# Patient Record
Sex: FEMALE | Race: WHITE | NOT HISPANIC OR LATINO | Employment: UNEMPLOYED | URBAN - METROPOLITAN AREA
[De-identification: names, ages, dates, MRNs, and addresses within clinical notes are randomized per-mention and may not be internally consistent; named-entity substitution may affect disease eponyms.]

---

## 2022-01-11 LAB — NONINV COLON CA DNA+OCC BLD SCRN STL QL: NEGATIVE

## 2022-02-03 ENCOUNTER — HISTORICAL (OUTPATIENT)
Dept: INTERNAL MEDICINE | Facility: CLINIC | Age: 52
End: 2022-02-03

## 2022-02-03 LAB
ABS NEUT (OLG): 6.24 (ref 2.1–9.2)
ALBUMIN SERPL-MCNC: 4 G/DL (ref 3.5–5)
ALBUMIN/GLOB SERPL: 1 {RATIO} (ref 1.1–2)
ALP SERPL-CCNC: 57 U/L (ref 40–150)
ALT SERPL-CCNC: 14 U/L (ref 0–55)
APPEARANCE, UA: CLEAR
AST SERPL-CCNC: 18 U/L (ref 5–34)
BACTERIA SPEC CULT: NORMAL
BASOPHILS # BLD AUTO: 0.1 10*3/UL (ref 0–0.2)
BASOPHILS NFR BLD AUTO: 1 %
BILIRUB SERPL-MCNC: 0.3 MG/DL
BILIRUB UR QL STRIP: NEGATIVE
BILIRUBIN DIRECT+TOT PNL SERPL-MCNC: 0.1 (ref 0–0.5)
BILIRUBIN DIRECT+TOT PNL SERPL-MCNC: 0.2 (ref 0–0.8)
BUN SERPL-MCNC: 10.1 MG/DL (ref 9.8–20.1)
CALCIUM SERPL-MCNC: 10.1 MG/DL (ref 8.7–10.5)
CHLORIDE SERPL-SCNC: 104 MMOL/L (ref 98–107)
CHOLEST SERPL-MCNC: 193 MG/DL
CHOLEST/HDLC SERPL: 4 {RATIO} (ref 0–5)
CO2 SERPL-SCNC: 28 MMOL/L (ref 22–29)
COLOR UR: COLORLESS
CREAT SERPL-MCNC: 0.75 MG/DL (ref 0.55–1.02)
DEPRECATED CALCIDIOL+CALCIFEROL SERPL-MC: 20.8 NG/ML (ref 30–80)
EOSINOPHIL # BLD AUTO: 0.1 10*3/UL (ref 0–0.9)
EOSINOPHIL NFR BLD AUTO: 1 %
ERYTHROCYTE [DISTWIDTH] IN BLOOD BY AUTOMATED COUNT: 18.6 % (ref 11.5–14.5)
EST. AVERAGE GLUCOSE BLD GHB EST-MCNC: 96.8 MG/DL
FERRITIN SERPL-MCNC: 4.27 NG/ML (ref 4.63–204)
FLAG2 (OHS): 50
FLAG3 (OHS): 80
FLAGS (OHS): 80
GLOBULIN SER-MCNC: 3.9 G/DL (ref 2.4–3.5)
GLUCOSE (UA): NORMAL
GLUCOSE SERPL-MCNC: 99 MG/DL (ref 74–100)
HBA1C MFR BLD: 5 %
HCT VFR BLD AUTO: 33.9 % (ref 35–46)
HDLC SERPL-MCNC: 51 MG/DL (ref 35–60)
HEMOLYSIS INTERF INDEX SERPL-ACNC: <0
HGB BLD-MCNC: 10.4 G/DL (ref 12–16)
HGB UR QL STRIP: NEGATIVE
HIV 1+2 AB+HIV1 P24 AG SERPL QL IA: 0.19
HIV 1+2 AB+HIV1 P24 AG SERPL QL IA: NONREACTIVE
HYALINE CASTS #/AREA URNS LPF: NORMAL /[LPF]
ICTERIC INTERF INDEX SERPL-ACNC: 0
IMM GRANULOCYTES # BLD AUTO: 0.03 10*3/UL
IMM GRANULOCYTES NFR BLD AUTO: 0 %
IRON SATN MFR SERPL: 4 % (ref 20–50)
IRON SERPL-MCNC: 18 UG/DL (ref 50–170)
KETONES UR QL STRIP: NEGATIVE
LDLC SERPL CALC-MCNC: 128 MG/DL (ref 50–140)
LEUKOCYTE ESTERASE UR QL STRIP: NEGATIVE
LIPEMIC INTERF INDEX SERPL-ACNC: 2
LOW EVENT # SUSPECT FLAG (OHS): 100
LYMPHOCYTES # BLD AUTO: 2.8 10*3/UL (ref 0.6–4.6)
LYMPHOCYTES NFR BLD AUTO: 29 %
MANUAL DIFF? (OHS): NO
MCH RBC QN AUTO: 22.8 PG (ref 26–34)
MCHC RBC AUTO-ENTMCNC: 30.7 G/DL (ref 31–37)
MCV RBC AUTO: 74.3 FL (ref 80–100)
MO BLASTS SUSPECT FLAG (OHS): 40
MONOCYTES # BLD AUTO: 0.5 10*3/UL (ref 0.1–1.3)
MONOCYTES NFR BLD AUTO: 5 %
NEUTROPHILS # BLD AUTO: 6.24 10*3/UL (ref 2.1–9.2)
NEUTROPHILS NFR BLD AUTO: 64 %
NITRITE UR QL STRIP: NEGATIVE
NRBC BLD AUTO-RTO: 0 % (ref 0–0.2)
PH UR STRIP: 7.5 [PH] (ref 4.5–8)
PLATELET # BLD AUTO: 395 10*3/UL (ref 130–400)
PLATELET CLUMPS SUSPECT FLAG (OHS): 20
PMV BLD AUTO: 9.3 FL (ref 7.4–10.4)
POTASSIUM SERPL-SCNC: 4.5 MMOL/L (ref 3.5–5.1)
PROT SERPL-MCNC: 7.9 G/DL (ref 6.4–8.3)
PROT UR QL STRIP: NEGATIVE
RBC # BLD AUTO: 4.56 10*6/UL (ref 4–5.2)
RBC #/AREA URNS HPF: NORMAL /[HPF] (ref 0–5)
SODIUM SERPL-SCNC: 137 MMOL/L (ref 136–145)
SP GR UR STRIP: 1.01 (ref 1–1.03)
SQUAMOUS EPITHELIAL, UA: NORMAL
TIBC SERPL-MCNC: 382 UG/DL (ref 70–310)
TIBC SERPL-MCNC: 400 UG/DL (ref 250–450)
TRANSFERRIN SERPL-MCNC: 382 MG/DL (ref 180–382)
TRIGL SERPL-MCNC: 68 MG/DL (ref 37–140)
TSH SERPL-ACNC: 1.94 M[IU]/L (ref 0.35–4.94)
URATE SERPL-MCNC: 3.5 MG/DL (ref 2.6–6)
UROBILINOGEN UR STRIP-ACNC: NORMAL
VLDLC SERPL CALC-MCNC: 14 MG/DL
WBC # SPEC AUTO: 9.7 10*3/UL (ref 4.5–11)
WBC #/AREA URNS HPF: NORMAL /[HPF] (ref 0–5)
ZZGIANT PLATELETS (OHS): 20

## 2022-06-22 ENCOUNTER — TELEPHONE (OUTPATIENT)
Dept: INTERNAL MEDICINE | Facility: CLINIC | Age: 52
End: 2022-06-22
Payer: MEDICAID

## 2022-07-08 ENCOUNTER — TELEPHONE (OUTPATIENT)
Dept: INTERNAL MEDICINE | Facility: CLINIC | Age: 52
End: 2022-07-08
Payer: MEDICAID

## 2022-07-08 NOTE — TELEPHONE ENCOUNTER
Please reschedule appt. Due to patient rescheduling her own appt. In patient portal. Thanks in advance

## 2022-07-19 ENCOUNTER — OFFICE VISIT (OUTPATIENT)
Dept: INTERNAL MEDICINE | Facility: CLINIC | Age: 52
End: 2022-07-19
Payer: MEDICAID

## 2022-07-19 VITALS
HEIGHT: 63 IN | BODY MASS INDEX: 28.95 KG/M2 | WEIGHT: 163.38 LBS | DIASTOLIC BLOOD PRESSURE: 88 MMHG | TEMPERATURE: 98 F | SYSTOLIC BLOOD PRESSURE: 131 MMHG | HEART RATE: 70 BPM | RESPIRATION RATE: 18 BRPM

## 2022-07-19 DIAGNOSIS — M17.0 OSTEOARTHRITIS OF BOTH KNEES, UNSPECIFIED OSTEOARTHRITIS TYPE: ICD-10-CM

## 2022-07-19 DIAGNOSIS — Z12.39 ENCOUNTER FOR SCREENING FOR MALIGNANT NEOPLASM OF BREAST, UNSPECIFIED SCREENING MODALITY: ICD-10-CM

## 2022-07-19 DIAGNOSIS — J30.2 SEASONAL ALLERGIES: Primary | ICD-10-CM

## 2022-07-19 DIAGNOSIS — Z12.4 PAP SMEAR FOR CERVICAL CANCER SCREENING: ICD-10-CM

## 2022-07-19 PROCEDURE — 99214 OFFICE O/P EST MOD 30 MIN: CPT | Mod: PBBFAC

## 2022-07-19 RX ORDER — LEVOCETIRIZINE DIHYDROCHLORIDE 5 MG/1
1 TABLET, FILM COATED ORAL NIGHTLY
COMMUNITY
Start: 2022-03-24 | End: 2022-07-19 | Stop reason: SDUPTHER

## 2022-07-19 RX ORDER — DICLOFENAC SODIUM 10 MG/G
2 GEL TOPICAL 4 TIMES DAILY PRN
Qty: 2 G | Refills: 2 | Status: SHIPPED | OUTPATIENT
Start: 2022-07-19 | End: 2023-08-15 | Stop reason: SDUPTHER

## 2022-07-19 RX ORDER — FLUTICASONE PROPIONATE 50 MCG
2 SPRAY, SUSPENSION (ML) NASAL DAILY
COMMUNITY
Start: 2022-06-24 | End: 2022-07-19 | Stop reason: SDUPTHER

## 2022-07-19 RX ORDER — FERROUS SULFATE 325(65) MG
1 TABLET, DELAYED RELEASE (ENTERIC COATED) ORAL EVERY OTHER DAY
COMMUNITY
Start: 2022-06-23 | End: 2022-07-19 | Stop reason: SDUPTHER

## 2022-07-19 RX ORDER — DICLOFENAC SODIUM 10 MG/G
2 GEL TOPICAL 4 TIMES DAILY PRN
COMMUNITY
Start: 2022-04-15 | End: 2022-07-19 | Stop reason: SDUPTHER

## 2022-07-19 RX ORDER — ERGOCALCIFEROL 1.25 MG/1
50000 CAPSULE ORAL
COMMUNITY
Start: 2022-02-05 | End: 2023-04-24 | Stop reason: ALTCHOICE

## 2022-07-19 RX ORDER — FERROUS SULFATE 325(65) MG
325 TABLET, DELAYED RELEASE (ENTERIC COATED) ORAL EVERY OTHER DAY
Qty: 90 TABLET | Refills: 3 | Status: SHIPPED | OUTPATIENT
Start: 2022-07-19 | End: 2023-04-24 | Stop reason: SDUPTHER

## 2022-07-19 RX ORDER — FLUTICASONE PROPIONATE 50 MCG
2 SPRAY, SUSPENSION (ML) NASAL DAILY
Qty: 11.1 ML | Refills: 2 | Status: SHIPPED | OUTPATIENT
Start: 2022-07-19 | End: 2023-04-24 | Stop reason: SDUPTHER

## 2022-07-19 RX ORDER — LEVOCETIRIZINE DIHYDROCHLORIDE 5 MG/1
5 TABLET, FILM COATED ORAL NIGHTLY
Qty: 90 TABLET | Refills: 3 | Status: SHIPPED | OUTPATIENT
Start: 2022-07-19 | End: 2023-04-24 | Stop reason: SDUPTHER

## 2022-07-19 RX ORDER — CHOLECALCIFEROL (VITAMIN D3) 25 MCG
1000 TABLET ORAL DAILY
COMMUNITY
Start: 2022-06-23

## 2022-07-19 RX ORDER — DICLOFENAC SODIUM 75 MG/1
75 TABLET, DELAYED RELEASE ORAL 2 TIMES DAILY
COMMUNITY
Start: 2022-04-20

## 2022-07-19 NOTE — PROGRESS NOTES
[facility]    Subjective:        This is a 51-year-old female with PMH allergies, tobacco use, and bilateral knee osteoarthritis who presents today for 6 month f/u. At last visit, she was referred to Ortho clinic here at [facility] and prescribed offloading brace and oral diclofenac with temporary relief and the brace has caused her to develop blisters. She has tried voltaren gel and NSAIDs, which provides minimal relief. She was referred to Orthopedic Surgery in [place], but states she does not have the moneyto travel that far. She is hesitant about surgery due to concerns for decreased mobility and the inability to complete her ADLs. She works at  store.     Today she states her knees are getting worse, left > right. Describes pain as shooting pain in knee that radiates down to LE. She also reports occasional locking of the knee. This pain has limited her activities.She has tried steroid injections, which only helped for a week.    Allergy symptoms have improved with flonase and xyzal. She does smoke 1 PPD and started smoking around age 16 then quit at age 26 then started again around age 42. She is not interested in quitting. Patient denies chest pain, palpitations, shortness of breath, abdominal pain, melena, hematochezia, dysuria, hematuria, nausea, vomiting, fever/chills, dizziness, lightheadedness.    Xray knee 3/2022  FINDINGS:     AP and lateral views of both knees were obtained. PA standing flexion  and sunrise views of both knees were obtained.     Left knee:     Tricompartmental arthritic changes affecting the left knee are seen  including moderate to severe medial compartment joint space narrowing,  medial compartment predominant subchondral sclerosis, and  tricompartmental marginal osteophytosis. No radiopaque foreign bodies  are visualized. No evidence of acute displaced fracture or active  dislocation is visualized. Small suprapatellar recess joint effusion  is seen. There  appears to be evidence of calcification along the  expected location of the superior MCL bilaterally, possibly related to  remote ligamentous injury.     Right knee:     Tricompartment arthritic changes affecting the right knee are seen  including severe medial compartment joint space narrowing, medial  compartment predominant subchondral sclerosis, and tricompartmental  marginal osteophytosis. Mild medial patellofemoral compartment joint  space narrowing is suspected. Multiple calcifications along the  posterior aspect of the knee are noted, similar to the previous study.  The possibility of joint bodies cannot be entirely excluded. There  appears to be evidence of calcification along the expected location of  the superior MCL bilaterally, possibly related to remote ligamentous  injury.     IMPRESSION:  1. No definite evidence of acute displaced fracture or active  dislocation is visualized. Medial compartment prominent arthritic  changes are seen. Additional findings and details as above.    Review of Systems:  10 point ROS negative except for HPI    Objective:   Vital Signs:  Vitals:    07/19/22 1247   BP: 131/88   Pulse: 70   Resp: 18   Temp: 97.9 °F (36.6 °C)        Body mass index is 28.95 kg/m².     General:  Well developed, well nourished, no acute respiratory distress  Head: Normocephalic, atraumatic  Eyes: PERRL, EOMI, anicteric sclera  Throat: No posterior pharyngeal erythema or exudate, no tonsillar exudate  Neck: supple, normal ROM, no JVD  CVS:  RRR, S1 and S2 normal, no murmurs, no added heart sounds, rubs, gallops, regular peripheral pulses, and no peripheral edema  Resp:  Lungs clear to auscultation bilaterally, no wheezes, rales, or rhonci  GI:  Abdomen soft, non-tender, non-distended, normoactive bowel sounds  MSK:  Full range of motion, swelling bilateral knees. L knee in brace.   Skin:  No rashes, ulcers, erythema. Blisters posterior L knee   Neuro:  Alert and oriented x3, No focal neuro  deficits, CNII-XII grossly intact  Psych:  Appropriate mood and affect         Laboratory:  Lab Results   Component Value Date    WBC 9.7 02/03/2022    HGB 10.4 02/03/2022    HCT 33.9 02/03/2022     02/03/2022    MCV 74.3 02/03/2022    RDW 18.6 02/03/2022     Lab Results   Component Value Date    HGBA1C 5.0 02/03/2022    EAG 96.8 02/03/2022    CREATININE 0.75 02/03/2022    Lab Results   Component Value Date     02/03/2022    K 4.5 02/03/2022    CO2 28 02/03/2022    BUN 10.1 02/03/2022    CREATININE 0.75 02/03/2022    CALCIUM 10.1 02/03/2022     Lab Results   Component Value Date    TSH 1.9372 02/03/2022          Anemia:   Lab Results   Component Value Date    IRON 18 02/03/2022    TIBC 400 02/03/2022    FERRITIN 4.27 02/03/2022       Current Medications:  Current Outpatient Medications   Medication Instructions    diclofenac (VOLTAREN) 75 mg, Oral, 2 times daily    diclofenac sodium (VOLTAREN) 2 g, Topical (Top), 4 times daily PRN    ergocalciferol (ERGOCALCIFEROL) 50,000 Units, Oral, Every 7 days    ferrous sulfate 325 mg, Oral, Every other day    fluticasone propionate (FLONASE) 100 mcg, Each Nostril, Daily    levocetirizine (XYZAL) 5 mg, Oral, Nightly    vitamin D (VITAMIN D3) 1,000 Units, Oral, Daily        Assessment and Plan:        Health Maintenance   Topic Date Due    Hepatitis C Screening  Never done    TETANUS VACCINE  Never done    Mammogram  Never done    Lipid Panel  02/03/2027        Seasonal Allergies  - continue levo cetirizine and flonase   - has improved symptoms     Bilateral knee osteoarthritis  -Bilateral xray 3/2022 showed medial compartment prominent arthritic  changes  -Sports medicine clinic prescribed diclofenac po and offloading brace   - minimal relief with voltaren gel   - recommend appt with Orthopedic surgery to discuss imaging and treatment options   - recommend continued physical therapy and continuihng diclofenac PO and voltaren gel for now     Tobacco  use  -Counseled patient on benefits of smoking cessation. Patient verbalized understanding. She states she is not ready to quit at this time    Health Maintenance/ Wellness  Pneumococcal vaccine: n/a  TDAP: UTD  Influenza vaccine: refused  Shingrix vaccine: Refused  AAA U/S screening:n/a  Bone Density Screening: n/a  Mammogram: referral sent today   Pap smear: Referral to gyn placed (preferably female)   Screening colonoscopy: Cologuard negative 1/2022. Repeat 3 years   Lung Cancer Screening: We will need a low-dose CT next year  Hepatitis Panel: Ordered  COVID-19 Vaccine: Refused    Counseling:  - Patient instructed to limit alcohol use  - Patient counselled on smoking cessation  - Educated on diet (portion control) and exercise (at least 30 minutes per day)  - Relevant educational materials provided      Medications: reconciled, discussed and refills given.  RTC in 6 months              Hilda Masters MD

## 2022-07-19 NOTE — PROGRESS NOTES
[facility] INTERNAL MEDICINE  OUTPATIENT OFFICE VISIT NOTE    SUBJECTIVE:      Chief Complaint: Establish Care (Establish care with new PCP. Knee brace concerns.9/10 pain when brace is removed. )       HPI:       Past Medical History:   has no past medical history on file.     Past Surgical History:   has a past surgical history that includes Knee surgery (Left).     Family History:  family history includes Arthritis in her maternal grandfather, maternal grandmother, and mother; COPD in her mother; Cancer in her maternal grandfather; Diabetes in her maternal grandfather; Heart disease in her maternal grandfather and mother; Hypertension in her maternal grandfather.     Social History:   reports that she has been smoking cigarettes. She has been smoking about 1.00 pack per day. She has never used smokeless tobacco. She reports previous alcohol use. She reports current drug use. Frequency: 4.00 times per week. Drug: Marijuana.     Allergies:  is allergic to hydrocodone-acetaminophen.     Home Medications:  Prior to Admission medications    Medication Sig Start Date End Date Taking? Authorizing Provider   diclofenac (VOLTAREN) 75 MG EC tablet Take 75 mg by mouth 2 (two) times daily. 4/20/22  Yes Historical Provider   diclofenac sodium (VOLTAREN) 1 % Gel Apply 2 g topically 4 (four) times daily as needed for Pain. 4/15/22  Yes Historical Provider   ferrous sulfate 325 (65 FE) MG EC tablet Take 1 tablet by mouth every other day. 6/23/22  Yes Historical Provider   fluticasone propionate (FLONASE) 50 mcg/actuation nasal spray 2 sprays by Each Nostril route once daily. 6/24/22  Yes Historical Provider   levocetirizine (XYZAL) 5 MG tablet Take 1 tablet by mouth every evening. 3/24/22  Yes Historical Provider   vitamin D (VITAMIN D3) 1000 units Tab Take 1,000 Units by mouth once daily. 6/23/22  Yes Historical Provider   ergocalciferol (ERGOCALCIFEROL) 50,000 unit Cap Take 50,000 Units by mouth every 7 days. 2/5/22   Historical  "Provider   albuterol 90 mcg/actuation inhaler Inhale 1-2 puffs into the lungs every 6 (six) hours as needed for Wheezing. Rescue 2/11/17 7/19/22  Gladys Goetz MD       ROS:  [unfilled]       OBJECTIVE:     Vital signs:   /88 (BP Location: Left arm, Patient Position: Sitting, BP Method: Medium (Automatic))   Pulse 70   Temp 97.9 °F (36.6 °C) (Oral)   Resp 18   Ht 5' 3" (1.6 m)   Wt 74.1 kg (163 lb 6.4 oz)   LMP 07/14/2022 (Exact Date)   BMI 28.95 kg/m²      Physical Examination:  {Physical Exam:77777}    Labs:  {LABS:76805}      ASSESSMENT & PLAN:     No problem-specific Assessment & Plan notes found for this encounter.         ***  -    ***  -    ***  -    ***  -    ***  -      ***  -      @[facility]@      Return to clinic {Return to clinic:28472}    Hilda Masters MD  \[facility] [place] [facility]\"" Internal Medicine, HO-1        "

## 2022-07-19 NOTE — PROGRESS NOTES
Attending Physician Addendum  Pt seen and discussed with medicine resident in clinic  Care provided is appropriate  Pt encouraged to consider an orthopedic surgery referral to discuss her options  Agree with above ASSESSMENT AND PLAN

## 2023-04-24 ENCOUNTER — OFFICE VISIT (OUTPATIENT)
Dept: INTERNAL MEDICINE | Facility: CLINIC | Age: 53
End: 2023-04-24
Payer: MEDICAID

## 2023-04-24 VITALS
SYSTOLIC BLOOD PRESSURE: 132 MMHG | HEART RATE: 77 BPM | TEMPERATURE: 98 F | OXYGEN SATURATION: 95 % | DIASTOLIC BLOOD PRESSURE: 78 MMHG | RESPIRATION RATE: 19 BRPM | WEIGHT: 168 LBS | HEIGHT: 63 IN | BODY MASS INDEX: 29.77 KG/M2

## 2023-04-24 DIAGNOSIS — M17.0 OSTEOARTHRITIS OF BOTH KNEES, UNSPECIFIED OSTEOARTHRITIS TYPE: ICD-10-CM

## 2023-04-24 DIAGNOSIS — Z12.39 ENCOUNTER FOR SCREENING FOR MALIGNANT NEOPLASM OF BREAST, UNSPECIFIED SCREENING MODALITY: ICD-10-CM

## 2023-04-24 DIAGNOSIS — Z12.2 SCREENING FOR LUNG CANCER: ICD-10-CM

## 2023-04-24 DIAGNOSIS — J30.2 SEASONAL ALLERGIES: ICD-10-CM

## 2023-04-24 DIAGNOSIS — G47.00 INSOMNIA, UNSPECIFIED TYPE: ICD-10-CM

## 2023-04-24 DIAGNOSIS — D50.9 IRON DEFICIENCY ANEMIA, UNSPECIFIED IRON DEFICIENCY ANEMIA TYPE: Primary | ICD-10-CM

## 2023-04-24 DIAGNOSIS — Z00.00 HEALTHCARE MAINTENANCE: ICD-10-CM

## 2023-04-24 DIAGNOSIS — Z12.4 CERVICAL CANCER SCREENING: ICD-10-CM

## 2023-04-24 PROCEDURE — 99215 OFFICE O/P EST HI 40 MIN: CPT | Mod: PBBFAC

## 2023-04-24 RX ORDER — FLUTICASONE PROPIONATE 50 MCG
2 SPRAY, SUSPENSION (ML) NASAL DAILY
Qty: 11.1 ML | Refills: 2 | Status: SHIPPED | OUTPATIENT
Start: 2023-04-24 | End: 2023-08-15 | Stop reason: SDUPTHER

## 2023-04-24 RX ORDER — LEVOCETIRIZINE DIHYDROCHLORIDE 5 MG/1
5 TABLET, FILM COATED ORAL NIGHTLY
Qty: 90 TABLET | Refills: 3 | Status: SHIPPED | OUTPATIENT
Start: 2023-04-24 | End: 2023-08-15 | Stop reason: SDUPTHER

## 2023-04-24 RX ORDER — DICLOFENAC SODIUM 10 MG/G
2 GEL TOPICAL 4 TIMES DAILY
Qty: 100 G | Refills: 3 | Status: SHIPPED | OUTPATIENT
Start: 2023-04-24 | End: 2023-11-24 | Stop reason: SDUPTHER

## 2023-04-24 RX ORDER — DICLOFENAC SODIUM 75 MG/1
75 TABLET, DELAYED RELEASE ORAL 2 TIMES DAILY
Status: CANCELLED | OUTPATIENT
Start: 2023-04-24

## 2023-04-24 RX ORDER — FERROUS SULFATE 325(65) MG
325 TABLET, DELAYED RELEASE (ENTERIC COATED) ORAL EVERY OTHER DAY
Qty: 90 TABLET | Refills: 3 | Status: SHIPPED | OUTPATIENT
Start: 2023-04-24 | End: 2023-08-15 | Stop reason: SDUPTHER

## 2023-04-24 RX ORDER — TRAZODONE HYDROCHLORIDE 50 MG/1
25 TABLET ORAL NIGHTLY
Qty: 30 TABLET | Refills: 1 | Status: SHIPPED | OUTPATIENT
Start: 2023-04-24 | End: 2023-08-15 | Stop reason: SDUPTHER

## 2023-04-24 NOTE — PROGRESS NOTES
I have reviewed and concur with the resident's history, physical, assessment, and plan.  I have discussed with him all issues related to the diagnosis, workup and treatment plan. Care provided as reasonable and necessary.    Jay Alex MD  [facility]

## 2023-04-24 NOTE — PROGRESS NOTES
[facility] Resident [facility]    Subjective:        This is a 51-year-old female with PMH allergies, tobacco use, and bilateral knee osteoarthritis who presents today for 6 month f/u. Patient continues to complains on bilateral knee pain and left sided sciatica. She has tried voltaren gel and NSAIDs, which provides minimal relief. She will be referred to Ortho for further evaluation and possible intraarticular injections. She is hesitant about surgery due to concerns for decreased mobility and the inability to complete her ADLs. 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 She has trouble going to sleep and staying asleep. Only sleeps for 2 hours. Has tried melatonin in the past w/o relief.     Allergy symptoms have improved with flonase and xyzal. She does smoke 1 PPD and started smoking around age 16 then quit at age 26 then started again around age 42. She is not interested in quitting. Patient denies chest pain, palpitations, shortness of breath, abdominal pain, melena, hematochezia, dysuria, hematuria, nausea, vomiting, fever/chills, dizziness, lightheadedness.        Xray knee 3/2022  FINDINGS:     AP and lateral views of both knees were obtained. PA standing flexion  and sunrise views of both knees were obtained.     Left knee:     Tricompartmental arthritic changes affecting the left knee are seen  including moderate to severe medial compartment joint space narrowing,  medial compartment predominant subchondral sclerosis, and  tricompartmental marginal osteophytosis. No radiopaque foreign bodies  are visualized. No evidence of acute displaced fracture or active  dislocation is visualized. Small suprapatellar recess joint effusion  is seen. There appears to be evidence of calcification along the  expected location of the superior MCL bilaterally, possibly related to  remote ligamentous injury.     Right knee:     Tricompartment arthritic changes affecting the right knee are  seen  including severe medial compartment joint space narrowing, medial  compartment predominant subchondral sclerosis, and tricompartmental  marginal osteophytosis. Mild medial patellofemoral compartment joint  space narrowing is suspected. Multiple calcifications along the  posterior aspect of the knee are noted, similar to the previous study.  The possibility of joint bodies cannot be entirely excluded. There  appears to be evidence of calcification along the expected location of  the superior MCL bilaterally, possibly related to remote ligamentous  injury.     IMPRESSION:  1. No definite evidence of acute displaced fracture or active  dislocation is visualized. Medial compartment prominent arthritic  changes are seen. Additional findings and details as above.    Review of Systems:  10 point ROS negative except for HPI    Objective:   Vital Signs:  Vitals:    04/24/23 0918   BP: 132/78   Pulse:    Resp:    Temp:         Body mass index is 29.76 kg/m².     General:  Well developed, well nourished, no acute respiratory distress  Head: Normocephalic, atraumatic  Eyes: PERRL, EOMI, anicteric sclera  Throat: No posterior pharyngeal erythema or exudate, no tonsillar exudate  Neck: supple, normal ROM, no JVD  CVS:  RRR, S1 and S2 normal, no murmurs, no added heart sounds, rubs, gallops, regular peripheral pulses, and no peripheral edema  Resp:  Lungs clear to auscultation bilaterally, no wheezes, rales, or rhonci  GI:  Abdomen soft, non-tender, non-distended, normoactive bowel sounds  MSK:  Full range of motion, swelling bilateral knees. L knee in brace.   Skin:  No rashes, ulcers, erythema. Blisters posterior L knee   Neuro:  Alert and oriented x3, No focal neuro deficits, CNII-XII grossly intact  Psych:  Appropriate mood and affect         Laboratory:  Lab Results   Component Value Date    WBC 9.7 02/03/2022    HGB 10.4 02/03/2022    HCT 33.9 02/03/2022     02/03/2022    MCV 74.3 02/03/2022    RDW 18.6  02/03/2022     Lab Results   Component Value Date    HGBA1C 5.0 02/03/2022    EAG 96.8 02/03/2022    CREATININE 0.75 02/03/2022    Lab Results   Component Value Date     02/03/2022    K 4.5 02/03/2022    CO2 28 02/03/2022    BUN 10.1 02/03/2022    CREATININE 0.75 02/03/2022    CALCIUM 10.1 02/03/2022     Lab Results   Component Value Date    TSH 1.9372 02/03/2022          Anemia:   Lab Results   Component Value Date    IRON 18 02/03/2022    TIBC 400 02/03/2022    FERRITIN 4.27 02/03/2022       Current Medications:  Current Outpatient Medications   Medication Instructions    diclofenac sodium (VOLTAREN) 2 g, Topical (Top), 4 times daily PRN    ergocalciferol (ERGOCALCIFEROL) 50,000 Units, Oral, Every 7 days    ferrous sulfate 325 mg, Oral, Every other day    fluticasone propionate (FLONASE) 100 mcg, Each Nostril, Daily    levocetirizine (XYZAL) 5 mg, Oral, Nightly    vitamin D (VITAMIN D3) 1,000 Units, Oral, Daily        Assessment and Plan:        Health Maintenance   Topic Date Due    Hepatitis C Screening  Never done    TETANUS VACCINE  Never done    Mammogram  Never done    Lipid Panel  02/03/2027        Seasonal Allergies  - continue levo cetirizine and flonase   - has improved symptoms     Bilateral knee osteoarthritis  -Bilateral xray 3/2022 showed medial compartment prominent arthritic  changes  -Sports medicine clinic prescribed diclofenac po and offloading brace   - minimal relief with voltaren gel   - Referral to Orthopedic to discuss imaging and treatment options. She is interested in injections\.   - recommend continued physical therapy and ibuprofen and voltaren gel for now     Tobacco use  -Counseled patient on benefits of smoking cessation. Patient verbalized understanding. She states she is not ready to quit at this time  --ordering low dose CT for lung ca screening     Insomnia   --difficulties falling asleep and staying asleep, only sleeps about 2 hours.   --Has tried sleep hygiene and  melatonin without relief.   --Will prescribe trazodone 25 mg and monitor for improvement     Health Maintenance/ Wellness  Pneumococcal vaccine: n/a  TDAP: UTD  Influenza vaccine: refused  Shingrix vaccine: Refused  AAA U/S screening:n/a  Bone Density Screening: n/a  Mammogram: referral sent today   Pap smear: Referral to gyn placed (preferably female)   Screening colonoscopy: Cologuard negative 1/2022. Repeat 3 years   Lung Cancer Screening:ordered today   Hepatitis Panel: Ordered  COVID-19 Vaccine: Refused    Counseling:  - Patient instructed to limit alcohol use  - Patient counselled on smoking cessation  - Educated on diet (portion control) and exercise (at least 30 minutes per day)  - Relevant educational materials provided      Medications: reconciled, discussed and refills given.  RTC in 6 months              Hilda Masters MD

## 2023-04-26 ENCOUNTER — DOCUMENTATION ONLY (OUTPATIENT)
Dept: ADMINISTRATIVE | Facility: HOSPITAL | Age: 53
End: 2023-04-26
Payer: MEDICAID

## 2023-05-05 ENCOUNTER — PATIENT MESSAGE (OUTPATIENT)
Dept: INTERNAL MEDICINE | Facility: CLINIC | Age: 53
End: 2023-05-05
Payer: MEDICAID

## 2023-05-05 DIAGNOSIS — T78.2XXA ANAPHYLAXIS, INITIAL ENCOUNTER: Primary | ICD-10-CM

## 2023-05-05 RX ORDER — EPINEPHRINE 0.3 MG/.3ML
2 INJECTION SUBCUTANEOUS ONCE
Qty: 0.6 ML | Refills: 0 | Status: SHIPPED | OUTPATIENT
Start: 2023-05-05 | End: 2024-04-19

## 2023-05-06 ENCOUNTER — PATIENT MESSAGE (OUTPATIENT)
Dept: INTERNAL MEDICINE | Facility: CLINIC | Age: 53
End: 2023-05-06
Payer: MEDICAID

## 2023-05-11 NOTE — TELEPHONE ENCOUNTER
Call placed to Ortho to f/u on pt apt. Ortho stated they attempted to set apt. And a msg was left to have patient RTC to set apt. Contacted pt who stated she can't see them until next month due to her job. In formed pt she needs to set apt ASAP. Informed pt her request will be sent to M.D.

## 2023-06-22 ENCOUNTER — PATIENT MESSAGE (OUTPATIENT)
Dept: INTERNAL MEDICINE | Facility: CLINIC | Age: 53
End: 2023-06-22
Payer: MEDICAID

## 2023-06-22 ENCOUNTER — OFFICE VISIT (OUTPATIENT)
Dept: ORTHOPEDICS | Facility: CLINIC | Age: 53
End: 2023-06-22
Payer: MEDICAID

## 2023-06-22 ENCOUNTER — HOSPITAL ENCOUNTER (OUTPATIENT)
Dept: RADIOLOGY | Facility: HOSPITAL | Age: 53
Discharge: HOME | End: 2023-06-22
Attending: STUDENT IN AN ORGANIZED HEALTH CARE EDUCATION/TRAINING PROGRAM
Payer: MEDICAID

## 2023-06-22 VITALS
DIASTOLIC BLOOD PRESSURE: 80 MMHG | SYSTOLIC BLOOD PRESSURE: 138 MMHG | HEART RATE: 81 BPM | HEIGHT: 64 IN | WEIGHT: 170 LBS | BODY MASS INDEX: 29.02 KG/M2

## 2023-06-22 DIAGNOSIS — M17.0 OSTEOARTHRITIS OF BOTH KNEES, UNSPECIFIED OSTEOARTHRITIS TYPE: ICD-10-CM

## 2023-06-22 DIAGNOSIS — M17.0 OSTEOARTHRITIS OF BOTH KNEES, UNSPECIFIED OSTEOARTHRITIS TYPE: Primary | ICD-10-CM

## 2023-06-22 DIAGNOSIS — Z87.828 HISTORY OF MENISCAL TEAR: ICD-10-CM

## 2023-06-22 PROCEDURE — 99214 OFFICE O/P EST MOD 30 MIN: CPT | Mod: PBBFAC

## 2023-06-22 PROCEDURE — 73564 X-RAY EXAM KNEE 4 OR MORE: CPT | Mod: TC,LT

## 2023-06-22 PROCEDURE — 73564 X-RAY EXAM KNEE 4 OR MORE: CPT | Mod: TC,RT

## 2023-06-22 RX ORDER — MELOXICAM 15 MG/1
15 TABLET ORAL DAILY
Qty: 7 TABLET | Refills: 0 | Status: SHIPPED | OUTPATIENT
Start: 2023-06-22 | End: 2023-06-29

## 2023-06-22 RX ORDER — LEVOCETIRIZINE DIHYDROCHLORIDE 5 MG/1
1 TABLET, FILM COATED ORAL NIGHTLY
COMMUNITY
Start: 2022-07-19

## 2023-06-22 RX ORDER — FLUTICASONE PROPIONATE 50 MCG
100 SPRAY, SUSPENSION (ML) NASAL
COMMUNITY
Start: 2022-07-19 | End: 2023-12-29 | Stop reason: SDUPTHER

## 2023-06-22 RX ORDER — CHOLECALCIFEROL (VITAMIN D3) 25 MCG
1 TABLET ORAL DAILY
COMMUNITY
Start: 2022-12-05
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
## 2023-06-22 NOTE — LETTER
June 22, 2023      [facility]  [address] [place] [id number]  Phone: [phone]       Patient: Beverly Nixon   YOB: 1970  Date of Visit: 06/22/2023    To Whom It May Concern:    Josias Nixon  was at [facility] on 06/22/2023. The patient may return to work/school on 06/22/2023 with no restrictions. If you have any questions or concerns, or if I can be of further assistance, please do not hesitate to contact me.    Sincerely,    MD Emelia Hall MA

## 2023-06-23 ENCOUNTER — TELEPHONE (OUTPATIENT)
Dept: INTERNAL MEDICINE | Facility: CLINIC | Age: 53
End: 2023-06-23
Payer: MEDICAID

## 2023-06-23 DIAGNOSIS — M19.90 ARTHRITIS: Primary | ICD-10-CM

## 2023-06-23 NOTE — PROGRESS NOTES
Faculty Attestation: Beverly Nixon  was seen in [facility]. Discussed with resident at the time of the visit. History of Present Illness, Physical Exam, and Assessment and Plan reviewed. Treatment plan is reasonable and appropriate. Compliance with treatment recommendations is important.  Radiology images independently reviewed and agree with resident interpretation.  No procedure was performed.     Bunny Arreguin MD

## 2023-06-23 NOTE — TELEPHONE ENCOUNTER
I called [facility]'s pharmacy this morning and provided them with the correct diagnosis code. They were able to process the prescription and it is ready for pickup.

## 2023-06-23 NOTE — TELEPHONE ENCOUNTER
Called pt regarding request for labs. Unable to contact her.Left message to have her RTC to clinic @ [phone].

## 2023-06-23 NOTE — TELEPHONE ENCOUNTER
Good Morning , Pt request labs to be done . Pt seen in Ortho, Refer to Visit.They have suggested that she have a work up for possible Arthritis/ RA. Please address. Thank you

## 2023-06-23 NOTE — TELEPHONE ENCOUNTER
Patient stated a diagnosis code is needed to be attached to Meloxicam rx that was prescribed on yesterday while in [facility]. Please review and advise

## 2023-06-23 NOTE — TELEPHONE ENCOUNTER
----- Message from Grisel Prescott MD sent at 6/23/2023  7:52 AM CDT -----  I associated proper code with meloxicam prescription. Or do you need me to send the prescription again? Let me know, thanks.     Dr. Mitchell

## 2023-06-26 ENCOUNTER — TELEPHONE (OUTPATIENT)
Dept: INTERNAL MEDICINE | Facility: CLINIC | Age: 53
End: 2023-06-26
Payer: MEDICAID

## 2023-06-26 NOTE — TELEPHONE ENCOUNTER
Called pt to inform her that Auto [facility] labs have been ordered. Pt verbalize understanding of information giving. States she will have labs done.

## 2023-07-10 ENCOUNTER — PATIENT MESSAGE (OUTPATIENT)
Dept: ADMINISTRATIVE | Facility: HOSPITAL | Age: 53
End: 2023-07-10
Payer: MEDICAID

## 2023-07-24 ENCOUNTER — PATIENT MESSAGE (OUTPATIENT)
Dept: INTERNAL MEDICINE | Facility: CLINIC | Age: 53
End: 2023-07-24
Payer: MEDICAID

## 2023-07-24 DIAGNOSIS — J30.9 ALLERGIC RHINITIS, UNSPECIFIED SEASONALITY, UNSPECIFIED TRIGGER: Primary | ICD-10-CM

## 2023-07-31 ENCOUNTER — PATIENT MESSAGE (OUTPATIENT)
Dept: INTERNAL MEDICINE | Facility: CLINIC | Age: 53
End: 2023-07-31
Payer: MEDICAID

## 2023-08-01 ENCOUNTER — PATIENT MESSAGE (OUTPATIENT)
Dept: INTERNAL MEDICINE | Facility: CLINIC | Age: 53
End: 2023-08-01
Payer: MEDICAID

## 2023-08-01 NOTE — TELEPHONE ENCOUNTER
Yes Rasta  [address] in between internal medicine and radiology . Thank you for your message!

## 2023-08-04 ENCOUNTER — HOSPITAL ENCOUNTER (OUTPATIENT)
Dept: RADIOLOGY | Facility: HOSPITAL | Age: 53
Discharge: HOME | End: 2023-08-04
Payer: MEDICAID

## 2023-08-04 DIAGNOSIS — Z12.39 ENCOUNTER FOR SCREENING FOR MALIGNANT NEOPLASM OF BREAST, UNSPECIFIED SCREENING MODALITY: ICD-10-CM

## 2023-08-04 PROCEDURE — 77067 SCR MAMMO BI INCL CAD: CPT | Mod: TC

## 2023-08-04 PROCEDURE — 77063 BREAST TOMOSYNTHESIS BI: CPT | Mod: 26,,, | Performed by: RADIOLOGY

## 2023-08-04 PROCEDURE — 77067 MAMMO DIGITAL SCREENING BILAT WITH TOMO: ICD-10-PCS | Mod: 26,,, | Performed by: RADIOLOGY

## 2023-08-04 PROCEDURE — 77063 MAMMO DIGITAL SCREENING BILAT WITH TOMO: ICD-10-PCS | Mod: 26,,, | Performed by: RADIOLOGY

## 2023-08-04 PROCEDURE — 77067 SCR MAMMO BI INCL CAD: CPT | Mod: 26,,, | Performed by: RADIOLOGY

## 2023-08-15 DIAGNOSIS — G47.00 INSOMNIA, UNSPECIFIED TYPE: ICD-10-CM

## 2023-08-15 DIAGNOSIS — M17.0 OSTEOARTHRITIS OF BOTH KNEES, UNSPECIFIED OSTEOARTHRITIS TYPE: ICD-10-CM

## 2023-08-15 DIAGNOSIS — J30.2 SEASONAL ALLERGIES: ICD-10-CM

## 2023-08-16 ENCOUNTER — PATIENT MESSAGE (OUTPATIENT)
Dept: INTERNAL MEDICINE | Facility: CLINIC | Age: 53
End: 2023-08-16
Payer: MEDICAID

## 2023-08-16 RX ORDER — FLUTICASONE PROPIONATE 50 MCG
2 SPRAY, SUSPENSION (ML) NASAL DAILY
Qty: 11.1 ML | Refills: 2 | Status: SHIPPED | OUTPATIENT
Start: 2023-08-16 | End: 2023-11-24 | Stop reason: SDUPTHER

## 2023-08-16 RX ORDER — LEVOCETIRIZINE DIHYDROCHLORIDE 5 MG/1
5 TABLET, FILM COATED ORAL NIGHTLY
Qty: 90 TABLET | Refills: 3 | Status: SHIPPED | OUTPATIENT
Start: 2023-08-16 | End: 2023-11-24 | Stop reason: SDUPTHER

## 2023-08-16 RX ORDER — TRAZODONE HYDROCHLORIDE 50 MG/1
25 TABLET ORAL NIGHTLY
Qty: 30 TABLET | Refills: 1 | Status: SHIPPED | OUTPATIENT
Start: 2023-08-16 | End: 2023-11-24 | Stop reason: SDUPTHER

## 2023-08-16 RX ORDER — DICLOFENAC SODIUM 10 MG/G
2 GEL TOPICAL 4 TIMES DAILY PRN
Qty: 2 G | Refills: 2 | Status: SHIPPED | OUTPATIENT
Start: 2023-08-16

## 2023-08-16 RX ORDER — FERROUS SULFATE 325(65) MG
325 TABLET, DELAYED RELEASE (ENTERIC COATED) ORAL EVERY OTHER DAY
Qty: 90 TABLET | Refills: 3 | Status: SHIPPED | OUTPATIENT
Start: 2023-08-16 | End: 2023-11-24 | Stop reason: SDUPTHER

## 2023-08-30 ENCOUNTER — PATIENT OUTREACH (OUTPATIENT)
Dept: ADMINISTRATIVE | Facility: HOSPITAL | Age: 53
End: 2023-08-30
Payer: MEDICAID

## 2023-08-30 NOTE — PROGRESS NOTES
Message sent to patient via [facility] patient portal as part of Population Health Bulk Outreach for cervical cancer screening. Chart reviewed. Pt has an appt scheduled in [facility] clinic on 3/8/2024. I encouraged pt to keep this very important appointment. If she has any urgent needs that may warrant a sooner appointment, I provided the phone number to contact the gyn clinic.    Population Health Chart Review & Patient Outreach Details:     Reason for Outreach Encounter:     []  Non-Compliant Report   []  Payor Report ([employer], PHN, BCBS, MSSP, MCIP, [facility], etc.)   []  Pre-Visit Chart Review     Updates Requested / Reviewed:     [x]  Care Everywhere    [x]     []  External Sources ([facility], [employer], DIS, etc.)   [x]  Care Team Updated    Patient Outreach Method:    []  Telephone Outreach Completed   [] Successful   [] Left Voicemail   [] Unable to Contact (wrong number, no voicemail)  [x]  [facility] Outreach Sent  []  Letter Outreach Mailed  []  Fax Sent for External Records  []  External Records Upload    Health Maintenance Topics Addressed and Outreach Outcomes / Actions Taken:        []      Breast Cancer Screening []  Mammo Scheduled      []  External Records Requested     []  Added Reminder to Complete to Upcoming Primary Care Appt Notes     []  Patient Declined     []  Patient Will Call Back to Schedule     []  Patient Will Schedule with External Provider / Order Routed if Applicable             [x]       Cervical Cancer Screening [x]  Pap Scheduled - appt 3/8/2024 [facility] clinic     []  External Records Requested     []  Added Reminder to Complete to Upcoming Primary Care Appt Notes     []  Patient Declined     []  Patient Will Call Back to Schedule     []  Patient Will Schedule with External Provider               []          Colorectal Cancer Screening []  Colonoscopy Case Request or Referral Placed     []  External Records Requested     []  Added Reminder to Complete to Upcoming Primary Care  Appt Notes     []  Patient Declined     []  Patient Will Call Back to Schedule     []  Patient Will Schedule with External Provider     []  Fit Kit Mailed (add the SmartPhrase under additional notes)     []  Reminded Patient to Complete Home Test             []      Diabetic Eye Exam []  Eye Camera Scheduled or Optometry Referral Placed     []  External Records Requested     []  Added Reminder to Complete to Upcoming Primary Care Appt Notes     []  Patient Declined     []  Patient Will Call Back to Schedule     []  Patient Will Schedule with External Provider             []      Blood Pressure Control []  Primary Care Follow Up Visit Scheduled     []  Remote Blood Pressure Reading Captured     []  Added Reminder to Complete to Upcoming Primary Care Appt Notes     []  Patient Declined     []  Patient Will Call Back / Patient Will Send Portal Message with Reading     []  Patient Will Call Back to Schedule Provider Visit             []       HbA1c & Other Labs []  Lab Appt Scheduled for Due Labs     []  Primary Care Follow Up Visit Scheduled      []  Reminded Patient to Complete Home Test     []  Added Reminder to Complete to Upcoming Primary Care Appt Notes     []  Patient Declined     []  Patient Will Call Back to Schedule     []  Patient Will Schedule with External Provider / Order Routed if Applicable           []    Schedule Primary Care Appt []  Primary Care Appt Scheduled     []  Patient Declined     []  Patient Will Call Back to Schedule     []  Pt Established with External Provider & Updated Care Team             []      Medication Adherence []  Primary Care Appointment Scheduled     []  Added Reminder to Upcoming Primary Care Appt Notes     []  Patient Reminded to  Prescription     []  Patient Declined, Provider Notified if Needed     []  Sent Provider Message to Review and/or Add Exclusion to Problem List             []      Osteoporosis Screening []  DXA Appointment Scheduled     []  External Records  Requested     []  Added Reminder to Complete to Upcoming Primary Care Appt Notes     []  Patient Declined     []  Patient Will Call Back to Schedule     []  Patient Will Schedule with External Provider / Order Routed if Applicable     Additional Care Coordinator Notes:         Further Action Needed If Patient Returns Outreach:

## 2023-09-13 ENCOUNTER — PATIENT MESSAGE (OUTPATIENT)
Dept: INTERNAL MEDICINE | Facility: CLINIC | Age: 53
End: 2023-09-13
Payer: MEDICAID

## 2023-09-13 DIAGNOSIS — M17.0 OSTEOARTHRITIS OF BOTH KNEES, UNSPECIFIED OSTEOARTHRITIS TYPE: ICD-10-CM

## 2023-09-14 RX ORDER — DICLOFENAC SODIUM 10 MG/G
2 GEL TOPICAL 4 TIMES DAILY PRN
Qty: 2 G | Refills: 2 | OUTPATIENT
Start: 2023-09-14

## 2023-10-19 ENCOUNTER — PATIENT MESSAGE (OUTPATIENT)
Dept: INTERNAL MEDICINE | Facility: CLINIC | Age: 53
End: 2023-10-19
Payer: MEDICAID

## 2023-11-24 DIAGNOSIS — G47.00 INSOMNIA, UNSPECIFIED TYPE: ICD-10-CM

## 2023-11-24 DIAGNOSIS — M17.0 OSTEOARTHRITIS OF BOTH KNEES, UNSPECIFIED OSTEOARTHRITIS TYPE: ICD-10-CM

## 2023-11-24 DIAGNOSIS — J30.2 SEASONAL ALLERGIES: ICD-10-CM

## 2023-11-29 RX ORDER — LEVOCETIRIZINE DIHYDROCHLORIDE 5 MG/1
5 TABLET, FILM COATED ORAL NIGHTLY
Qty: 30 TABLET | Refills: 0 | Status: SHIPPED | OUTPATIENT
Start: 2023-11-29

## 2023-11-29 RX ORDER — FERROUS SULFATE 325(65) MG
325 TABLET, DELAYED RELEASE (ENTERIC COATED) ORAL EVERY OTHER DAY
Qty: 30 TABLET | Refills: 0 | Status: SHIPPED | OUTPATIENT
Start: 2023-11-29

## 2023-11-29 RX ORDER — DICLOFENAC SODIUM 10 MG/G
2 GEL TOPICAL 4 TIMES DAILY
Qty: 100 G | Refills: 0 | Status: SHIPPED | OUTPATIENT
Start: 2023-11-29

## 2023-11-29 RX ORDER — FLUTICASONE PROPIONATE 50 MCG
2 SPRAY, SUSPENSION (ML) NASAL DAILY
Qty: 11.1 ML | Refills: 0 | Status: SHIPPED | OUTPATIENT
Start: 2023-11-29 | End: 2023-12-28 | Stop reason: SDUPTHER

## 2023-11-29 RX ORDER — TRAZODONE HYDROCHLORIDE 50 MG/1
25 TABLET ORAL NIGHTLY
Qty: 30 TABLET | Refills: 0 | Status: SHIPPED | OUTPATIENT
Start: 2023-11-29 | End: 2023-12-28 | Stop reason: SDUPTHER

## 2023-12-28 DIAGNOSIS — J30.2 SEASONAL ALLERGIES: ICD-10-CM

## 2023-12-28 DIAGNOSIS — G47.00 INSOMNIA, UNSPECIFIED TYPE: ICD-10-CM

## 2023-12-29 RX ORDER — FLUTICASONE PROPIONATE 50 MCG
2 SPRAY, SUSPENSION (ML) NASAL DAILY
Qty: 11.1 ML | Refills: 2 | Status: SHIPPED | OUTPATIENT
Start: 2023-12-29

## 2023-12-29 RX ORDER — METHOCARBAMOL 500 MG/1
500 TABLET, FILM COATED ORAL 4 TIMES DAILY
Qty: 30 TABLET | Refills: 0 | Status: SHIPPED | OUTPATIENT
Start: 2023-12-29 | End: 2023-12-29

## 2023-12-29 RX ORDER — TRAZODONE HYDROCHLORIDE 50 MG/1
25 TABLET ORAL NIGHTLY
Qty: 30 TABLET | Refills: 0 | Status: SHIPPED | OUTPATIENT
Start: 2023-12-29 | End: 2024-01-29 | Stop reason: SDUPTHER

## 2023-12-29 RX ORDER — METHOCARBAMOL 500 MG/1
500 TABLET, FILM COATED ORAL 4 TIMES DAILY PRN
Qty: 30 TABLET | Refills: 0 | Status: SHIPPED | OUTPATIENT
Start: 2023-12-29 | End: 2024-01-06

## 2023-12-29 NOTE — PROGRESS NOTES
Prescribed short term Robaxin as patient is complaining of back pain reportedly seen by ER two weeks ago (could not find records of this in our system). Will need to evaluate patient in person, please keep clinic appointment, might need further imaging and PT/OT referral, will determine on next visit. ER precautions include severe persistent back pain, fever, chills, weight loss, neurologic deficits.    Blas Barry MD  [employer] [facility] PGY-2